# Patient Record
Sex: MALE | Race: OTHER | HISPANIC OR LATINO | URBAN - METROPOLITAN AREA
[De-identification: names, ages, dates, MRNs, and addresses within clinical notes are randomized per-mention and may not be internally consistent; named-entity substitution may affect disease eponyms.]

---

## 2021-01-30 ENCOUNTER — EMERGENCY (EMERGENCY)
Facility: HOSPITAL | Age: 26
LOS: 0 days | Discharge: HOME | End: 2021-01-31
Attending: EMERGENCY MEDICINE
Payer: COMMERCIAL

## 2021-01-30 VITALS
TEMPERATURE: 97 F | RESPIRATION RATE: 18 BRPM | WEIGHT: 145.06 LBS | SYSTOLIC BLOOD PRESSURE: 140 MMHG | HEART RATE: 94 BPM | HEIGHT: 67 IN | DIASTOLIC BLOOD PRESSURE: 95 MMHG | OXYGEN SATURATION: 98 %

## 2021-01-30 DIAGNOSIS — M54.2 CERVICALGIA: ICD-10-CM
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
## 2021-01-30 DIAGNOSIS — S12.400A UNSPECIFIED DISPLACED FRACTURE OF FIFTH CERVICAL VERTEBRA, INITIAL ENCOUNTER FOR CLOSED FRACTURE: ICD-10-CM
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
## 2021-01-30 DIAGNOSIS — S12.500A UNSPECIFIED DISPLACED FRACTURE OF SIXTH CERVICAL VERTEBRA, INITIAL ENCOUNTER FOR CLOSED FRACTURE: ICD-10-CM

## 2021-01-30 DIAGNOSIS — S00.532A CONTUSION OF ORAL CAVITY, INITIAL ENCOUNTER: ICD-10-CM

## 2021-01-30 PROCEDURE — 81003 URINALYSIS AUTO W/O SCOPE: CPT

## 2021-01-30 PROCEDURE — 74177 CT ABD & PELVIS W/CONTRAST: CPT

## 2021-01-30 PROCEDURE — 70450 CT HEAD/BRAIN W/O DYE: CPT

## 2021-01-30 PROCEDURE — 71260 CT THORAX DX C+: CPT

## 2021-01-30 PROCEDURE — 86900 BLOOD TYPING SEROLOGIC ABO: CPT

## 2021-01-30 PROCEDURE — 72125 CT NECK SPINE W/O DYE: CPT

## 2021-01-30 PROCEDURE — 85025 COMPLETE CBC W/AUTO DIFF WBC: CPT

## 2021-01-30 PROCEDURE — 86850 RBC ANTIBODY SCREEN: CPT

## 2021-01-30 PROCEDURE — 85730 THROMBOPLASTIN TIME PARTIAL: CPT

## 2021-01-30 PROCEDURE — 99053 MED SERV 10PM-8AM 24 HR FAC: CPT

## 2021-01-30 PROCEDURE — 85610 PROTHROMBIN TIME: CPT

## 2021-01-30 PROCEDURE — 80053 COMPREHEN METABOLIC PANEL: CPT

## 2021-01-30 PROCEDURE — 86901 BLOOD TYPING SEROLOGIC RH(D): CPT

## 2021-01-30 PROCEDURE — 36415 COLL VENOUS BLD VENIPUNCTURE: CPT

## 2021-01-30 PROCEDURE — 99284 EMERGENCY DEPT VISIT MOD MDM: CPT

## 2021-01-30 PROCEDURE — 99284 EMERGENCY DEPT VISIT MOD MDM: CPT | Mod: 25

## 2021-01-30 RX ORDER — ACETAMINOPHEN 500 MG
650 TABLET ORAL ONCE
Refills: 0 | Status: COMPLETED | OUTPATIENT
Start: 2021-01-30 | End: 2021-01-30

## 2021-01-30 NOTE — ED PROVIDER NOTE - CARE PROVIDER_API CALL
hZeng Alberts (DO)  Orthopaedic Surgery  [address] [place] [address], [address] [place] [address]  Phone: [phone]  Fax: [phone]  Follow Up Time:
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
## 2021-01-30 NOTE — ED PROVIDER NOTE - MUSCULOSKELETAL, MLM
C-Collar in place; Spine appears normal without midline tenderness of C, T or L spine, range of motion is not limited, no muscle or joint tenderness C-Collar in place; Spine appears normal ; mild posterior lower C spine tenderness without focal bony tenderness; Spine without midline tenderness of  T or L spine, range of motion is not limited, no muscle or joint tenderness

## 2021-01-30 NOTE — ED PROVIDER NOTE - ENMT, MLM
Airway patent, Nasal mucosa clear. Mouth with normal mucosa. Inner surface of upper lip has punctate area of ecchymosis at midline without laceration or bleeding. Throat has no vesicles, no oropharyngeal exudates and uvula is midline.

## 2021-01-30 NOTE — ED PROVIDER NOTE - NSFOLLOWUPCLINICS_GEN_ALL_ED_FT
[facility] - [facility]  Ortho/Spine  [address] [place] [id number]  Phone: [phone]  Fax:   Follow Up Time:

## 2021-01-30 NOTE — ED PROVIDER NOTE - PATIENT PORTAL LINK FT
You can access the FollowMyHealth Patient Portal offered by [facility] by registering at the following website: [email or web address]. By joining [facility]’s FollowMyHealth portal, you will also be able to view your health information using other applications (apps) compatible with our system.
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
## 2021-01-30 NOTE — ED PROVIDER NOTE - PROGRESS NOTE DETAILS
TAMICAG:  Spoke to Dr. Alberts about CT reading.  Pain control, soft collar and outpatient ortho spine follow up and MRI recommended.  Patient does not wish to stay in ED to get MRI in AM.  He lives in [place], needs a ride home with his friends, and does not have any neurologic deficit.  Pain is controlled with tylenol.  Patient understands importance of calling orthopedics for follow up and MRI within 1 week. Pain controlled; no neuro deficit on repeat exam.  Patient ambulating ; waiting in hallway to be discharged.  Soft [place] Collar applied.

## 2021-01-30 NOTE — ED PROVIDER NOTE - NSFOLLOWUPINSTRUCTIONS_ED_ALL_ED_FT
Take ibuprofen 600mg every 6 hours as needed for pain.                     VERTEBRAL COMPRESSION FRACTURE - General Information           Fractura por compresión vertebral    LO QUE NECESITA SABER:    ¿Qué es la fractura por compresión vertebral?Marietta fractura por compresión vertebral (FCV) es un colapso o ruptura de un hueso de la columna vertebral. Las fracturas por compresión ocurren cuando hay demasiada presión en la vértebra. Las FCV ocurren con mayor frecuencia en las regiones torácica (media) y lumbar (baja) de ramirez columna vertebral. Las fracturas pueden ser de leves a graves.    Fractura por compresión de la columna vertebral         ¿Cuáles son los signos y síntomas de marietta fractura por compresión vertebral (FCV)?Es probable que no presente ningún signo o síntoma si tiene marietta FCV leve. Usted podría necesitar cualquiera de los siguientes para tratar marietta fractura más grave:  •Dolor de espalda repentino, severo y blanco      •Dolor de espalda que empeora cuando se pone de pie o camina      •Espasmos musculares en la espalda      •Problemas para orinar, o defecar      •Debilidad súbita en brazos y piernas      ¿Cómo se diagnostica marietta fractura por compresión vertebral (FCV)?Ramirez médico le hará preguntas acerca de las lesiones y enfermedades que ha tenido en el pasado. Ramirez médico le hará un examen físico y evaluara ramirez columna vertebral. Es posible que usted necesite hacerse alguno de los siguientes estudios:  •Radiografías, marietta tomografía computarizada o resonancia magnéticade la columna vertebral pueden ser tomadas. Es posible que le administren un líquido de contraste para ayudar a que la fractura se lee mejor en las imágenes. Dígale al médico si usted alguna vez ha tenido marietta reacción alérgica al líquido de contraste. No entre a la vasquez donde se realiza la resonancia magnética con algo de metal. El metal puede causar lesiones serias. Informe a ramirez médico si usted tiene algún metal dentro o sobre ramirez cuerpo.      •Marietta gammagrafía óseapuede mostrar fracturas de huesos u otros problemas en la columna vertebral, destiney marietta infección.      ¿Cómo se trata la fractura por compresión vertebral (FCV)?Usted podría necesitar cualquiera de los siguientes, dependiendo de la gravedad de la fractura:   •El reposo en camapodría ser necesario para marietta fractura leve.      •Un aparato ortopédico en ramirez espaldapodría ser necesario bonifacio 8 a 12 semanas. El soporte puede disminuir ramirez dolor, y ayudarle con la recuperación de ramirez vértebra.      •Un bastón o un andadorpuede ayudar a mantener el equilibrio mientras camina. [place] ayuda a prevenir marietta caída que podría causar más lesiones.      •Los medicamentosPodrían administrarle medicamentos para el dolor. Los bifosfonatos y la calcitonina son medicamentos que ayudan a fortalecer los huesos. Estos pueden disminuir el dolor de un FCV causada por osteoporosis y disminuir el riesgo de sufrir otra fractura.      •La fisioterapia o la terapia ocupacionalpodría recomendarse. Un fisioterapeuta le puede enseñar ejercicios para ayudarle a mejorar el movimiento y la fuerza, y para disminuir el dolor. Un terapeuta ocupacional le enseña habilidades para ayudarlo con yesika actividades diarias.      •La cirugíapodría ser necesaria si ramirez dolor, debilidad o entumecimiento no mejoran con otro tratamiento. La cirugía puede hacer ramirez columna más estable, y ayudar a disminuir la presión en yesika nervios causada por la fractura.      ¿Cómo puedo controlar el dolor mientras duermo?  •No duerma en marietta cama de agua. Las jeannie de agua no proporcionan un buen respaldo.      •Duerma en un colchón firme. También puede colocar marietta placa de keating de ½ a 1 pulgada entre el colchón y el somier.      •Duerma sobre ramirez espalda con marietta almohada debajo de yesika rodillas. Lookingglass disminuirá la presión en ramirez espalda. También puede dormir sobre ramirez lado con 1 o ambas rodillas dobladas y marietta almohada entre ellas. También puede ayudarle acostarse boca abajo con marietta almohada al nivel de ramirez cintura.      Llame al número de emergencias local (911 en los [place]) si:  •Usted se siente mareada, le hace falta el aire y tiene dolor de pecho.      •Usted expectora rhonda.      •No puede sentir yesika piernas súbitamente.      •Usted tiene dificultad súbita para  yesika brazos o piernas.      ¿Cuándo franki buscar atención inmediata?  •Ramirez brazo o pierna se siente caliente, sensible y adolorida. Se podría franchesca inflamado y vega.      •Usted tiene nuevos problemas para orinar o defecar.      •Usted tiene dolor severo en la espalda después de marietta caída, de agacharse, estornudar o toser mumtaz.      ¿Cuándo franki llamar a mi médico?  •No puede dormir o descansar por el dolor de ramirez espalda.      •Tiene dolor o inflamación en ramirez espalda que empeora o no desaparece.      •Usted tiene preguntas o inquietudes acerca de ramirez condición o cuidado.      ACUERDOS SOBRE RAMIREZ CUIDADO:    Usted tiene el derecho de ayudar a planear ramirez cuidado. Aprenda todo lo que pueda sobre ramirez condición y destiney darle tratamiento. Discuta yesika opciones de tratamiento con yesika médicos para decidir el cuidado que usted desea recibir. Usted siempre tiene el derecho de rechazar el tratamiento.

## 2021-01-31 VITALS
TEMPERATURE: 98 F | HEART RATE: 85 BPM | DIASTOLIC BLOOD PRESSURE: 82 MMHG | OXYGEN SATURATION: 100 % | SYSTOLIC BLOOD PRESSURE: 131 MMHG | RESPIRATION RATE: 16 BRPM

## 2021-01-31 LAB
ALBUMIN SERPL ELPH-MCNC: 4.7 G/DL — SIGNIFICANT CHANGE UP (ref 3.3–5)
ALP SERPL-CCNC: 81 U/L — SIGNIFICANT CHANGE UP (ref 40–120)
ALT FLD-CCNC: 17 U/L — SIGNIFICANT CHANGE UP (ref 12–78)
ANION GAP SERPL CALC-SCNC: 6 MMOL/L — SIGNIFICANT CHANGE UP (ref 5–17)
APPEARANCE UR: CLEAR — SIGNIFICANT CHANGE UP
APTT BLD: 29.9 SEC — SIGNIFICANT CHANGE UP (ref 27.5–35.5)
AST SERPL-CCNC: 16 U/L — SIGNIFICANT CHANGE UP (ref 15–37)
BASOPHILS # BLD AUTO: 0.04 K/UL — SIGNIFICANT CHANGE UP (ref 0–0.2)
BASOPHILS NFR BLD AUTO: 0.7 % — SIGNIFICANT CHANGE UP (ref 0–2)
BILIRUB SERPL-MCNC: 0.4 MG/DL — SIGNIFICANT CHANGE UP (ref 0.2–1.2)
BILIRUB UR-MCNC: NEGATIVE — SIGNIFICANT CHANGE UP
BUN SERPL-MCNC: 10 MG/DL — SIGNIFICANT CHANGE UP (ref 7–23)
CALCIUM SERPL-MCNC: 9.3 MG/DL — SIGNIFICANT CHANGE UP (ref 8.5–10.1)
CHLORIDE SERPL-SCNC: 106 MMOL/L — SIGNIFICANT CHANGE UP (ref 96–108)
CO2 SERPL-SCNC: 28 MMOL/L — SIGNIFICANT CHANGE UP (ref 22–31)
COLOR SPEC: YELLOW — SIGNIFICANT CHANGE UP
CREAT SERPL-MCNC: 0.73 MG/DL — SIGNIFICANT CHANGE UP (ref 0.5–1.3)
DIFF PNL FLD: NEGATIVE — SIGNIFICANT CHANGE UP
EOSINOPHIL # BLD AUTO: 0.08 K/UL — SIGNIFICANT CHANGE UP (ref 0–0.5)
EOSINOPHIL NFR BLD AUTO: 1.3 % — SIGNIFICANT CHANGE UP (ref 0–6)
GLUCOSE SERPL-MCNC: 83 MG/DL — SIGNIFICANT CHANGE UP (ref 70–99)
GLUCOSE UR QL: NEGATIVE MG/DL — SIGNIFICANT CHANGE UP
HCT VFR BLD CALC: 47.5 % — SIGNIFICANT CHANGE UP (ref 39–50)
HGB BLD-MCNC: 16.1 G/DL — SIGNIFICANT CHANGE UP (ref 13–17)
IMM GRANULOCYTES NFR BLD AUTO: 0.2 % — SIGNIFICANT CHANGE UP (ref 0–1.5)
INR BLD: 1.09 RATIO — SIGNIFICANT CHANGE UP (ref 0.88–1.16)
KETONES UR-MCNC: NEGATIVE — SIGNIFICANT CHANGE UP
LEUKOCYTE ESTERASE UR-ACNC: NEGATIVE — SIGNIFICANT CHANGE UP
LYMPHOCYTES # BLD AUTO: 1.49 K/UL — SIGNIFICANT CHANGE UP (ref 1–3.3)
LYMPHOCYTES # BLD AUTO: 24.3 % — SIGNIFICANT CHANGE UP (ref 13–44)
MCHC RBC-ENTMCNC: 29.5 PG — SIGNIFICANT CHANGE UP (ref 27–34)
MCHC RBC-ENTMCNC: 33.9 GM/DL — SIGNIFICANT CHANGE UP (ref 32–36)
MCV RBC AUTO: 87 FL — SIGNIFICANT CHANGE UP (ref 80–100)
MONOCYTES # BLD AUTO: 0.54 K/UL — SIGNIFICANT CHANGE UP (ref 0–0.9)
MONOCYTES NFR BLD AUTO: 8.8 % — SIGNIFICANT CHANGE UP (ref 2–14)
NEUTROPHILS # BLD AUTO: 3.97 K/UL — SIGNIFICANT CHANGE UP (ref 1.8–7.4)
NEUTROPHILS NFR BLD AUTO: 64.7 % — SIGNIFICANT CHANGE UP (ref 43–77)
NITRITE UR-MCNC: NEGATIVE — SIGNIFICANT CHANGE UP
PH UR: 6 — SIGNIFICANT CHANGE UP (ref 5–8)
PLATELET # BLD AUTO: 370 K/UL — SIGNIFICANT CHANGE UP (ref 150–400)
POTASSIUM SERPL-MCNC: 3.6 MMOL/L — SIGNIFICANT CHANGE UP (ref 3.5–5.3)
POTASSIUM SERPL-SCNC: 3.6 MMOL/L — SIGNIFICANT CHANGE UP (ref 3.5–5.3)
PROT SERPL-MCNC: 8.5 GM/DL — HIGH (ref 6–8.3)
PROT UR-MCNC: NEGATIVE MG/DL — SIGNIFICANT CHANGE UP
PROTHROM AB SERPL-ACNC: 12.6 SEC — SIGNIFICANT CHANGE UP (ref 10.6–13.6)
RBC # BLD: 5.46 M/UL — SIGNIFICANT CHANGE UP (ref 4.2–5.8)
RBC # FLD: 12.2 % — SIGNIFICANT CHANGE UP (ref 10.3–14.5)
SODIUM SERPL-SCNC: 140 MMOL/L — SIGNIFICANT CHANGE UP (ref 135–145)
SP GR SPEC: 1 — LOW (ref 1.01–1.02)
UROBILINOGEN FLD QL: NEGATIVE MG/DL — SIGNIFICANT CHANGE UP
WBC # BLD: 6.13 K/UL — SIGNIFICANT CHANGE UP (ref 3.8–10.5)
WBC # FLD AUTO: 6.13 K/UL — SIGNIFICANT CHANGE UP (ref 3.8–10.5)

## 2021-01-31 PROCEDURE — 74177 CT ABD & PELVIS W/CONTRAST: CPT | Mod: 26

## 2021-01-31 PROCEDURE — 70450 CT HEAD/BRAIN W/O DYE: CPT | Mod: 26

## 2021-01-31 PROCEDURE — 72125 CT NECK SPINE W/O DYE: CPT | Mod: 26

## 2021-01-31 PROCEDURE — 71260 CT THORAX DX C+: CPT | Mod: 26

## 2021-01-31 RX ADMIN — Medication 650 MILLIGRAM(S): at 01:59

## 2021-01-31 NOTE — ED ADULT NURSE NOTE - CHPI ED NUR SYMPTOMS NEG
no acting out behaviors/no back pain/no bruising/no crying/no decreased eating/drinking/no difficulty bearing weight/no disorientation/no dizziness/no fussiness/no headache/no laceration/no loss of consciousness/no sleeping issues
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
## 2021-01-31 NOTE — ED ADULT NURSE NOTE - NSIMPLEMENTINTERV_GEN_ALL_ED
Implemented All Universal Safety Interventions:  [place] to call system. Call bell, personal items and telephone within reach. Instruct patient to call for assistance. Room bathroom lighting operational. Non-slip footwear when patient is off stretcher. Physically safe environment: no spills, clutter or unnecessary equipment. Stretcher in lowest position, wheels locked, appropriate side rails in place.